# Patient Record
Sex: MALE | Race: WHITE | NOT HISPANIC OR LATINO | Employment: UNEMPLOYED | URBAN - NONMETROPOLITAN AREA
[De-identification: names, ages, dates, MRNs, and addresses within clinical notes are randomized per-mention and may not be internally consistent; named-entity substitution may affect disease eponyms.]

---

## 2017-05-18 ENCOUNTER — ALLSCRIPTS OFFICE VISIT (OUTPATIENT)
Dept: FAMILY MEDICINE CLINIC | Facility: CLINIC | Age: 22
End: 2017-05-18
Payer: COMMERCIAL

## 2017-05-18 DIAGNOSIS — R94.31 ABNORMAL ELECTROCARDIOGRAM: ICD-10-CM

## 2017-05-18 DIAGNOSIS — R06.02 SHORTNESS OF BREATH: ICD-10-CM

## 2017-05-18 PROCEDURE — 99213 OFFICE O/P EST LOW 20 MIN: CPT | Performed by: NURSE PRACTITIONER

## 2017-05-26 ENCOUNTER — TRANSCRIBE ORDERS (OUTPATIENT)
Dept: ADMINISTRATIVE | Facility: HOSPITAL | Age: 22
End: 2017-05-26

## 2017-05-26 DIAGNOSIS — R06.02 SHORTNESS OF BREATH: Primary | ICD-10-CM

## 2017-06-01 ENCOUNTER — GENERIC CONVERSION - ENCOUNTER (OUTPATIENT)
Dept: OTHER | Facility: OTHER | Age: 22
End: 2017-06-01

## 2017-06-01 ENCOUNTER — HOSPITAL ENCOUNTER (OUTPATIENT)
Dept: NON INVASIVE DIAGNOSTICS | Facility: HOSPITAL | Age: 22
Discharge: HOME | End: 2017-06-01
Payer: COMMERCIAL

## 2017-06-01 ENCOUNTER — HOSPITAL ENCOUNTER (OUTPATIENT)
Dept: PULMONOLOGY | Facility: HOSPITAL | Age: 22
Discharge: HOME | End: 2017-06-01
Payer: COMMERCIAL

## 2017-06-01 ENCOUNTER — APPOINTMENT (OUTPATIENT)
Dept: LAB | Facility: HOSPITAL | Age: 22
End: 2017-06-01
Payer: COMMERCIAL

## 2017-06-01 ENCOUNTER — HOSPITAL ENCOUNTER (OUTPATIENT)
Dept: RADIOLOGY | Facility: HOSPITAL | Age: 22
Discharge: HOME | End: 2017-06-01
Payer: COMMERCIAL

## 2017-06-01 DIAGNOSIS — R06.02 SHORTNESS OF BREATH: ICD-10-CM

## 2017-06-01 LAB
ALBUMIN SERPL BCP-MCNC: 4 G/DL (ref 3.5–5)
ALP SERPL-CCNC: 82 U/L (ref 46–116)
ALT SERPL W P-5'-P-CCNC: 90 U/L (ref 12–78)
ANION GAP SERPL CALCULATED.3IONS-SCNC: 10 MMOL/L (ref 4–13)
AST SERPL W P-5'-P-CCNC: 32 U/L (ref 5–45)
ATRIAL RATE: 75 BPM
BILIRUB SERPL-MCNC: 0.4 MG/DL (ref 0.2–1)
BUN SERPL-MCNC: 12 MG/DL (ref 5–25)
CALCIUM SERPL-MCNC: 9.7 MG/DL (ref 8.3–10.1)
CHLORIDE SERPL-SCNC: 103 MMOL/L (ref 100–108)
CHOLEST SERPL-MCNC: 198 MG/DL (ref 50–200)
CO2 SERPL-SCNC: 26 MMOL/L (ref 21–32)
CREAT SERPL-MCNC: 0.91 MG/DL (ref 0.6–1.3)
ERYTHROCYTE [DISTWIDTH] IN BLOOD BY AUTOMATED COUNT: 12.5 % (ref 11.6–15.1)
GFR SERPL CREATININE-BSD FRML MDRD: >60 ML/MIN/1.73SQ M
GLUCOSE P FAST SERPL-MCNC: 99 MG/DL (ref 65–99)
HCT VFR BLD AUTO: 45.2 % (ref 36.5–49.3)
HDLC SERPL-MCNC: 44 MG/DL (ref 40–60)
HGB BLD-MCNC: 15.9 G/DL (ref 12–17)
LDLC SERPL CALC-MCNC: 119 MG/DL (ref 0–100)
MCH RBC QN AUTO: 30.6 PG (ref 26.8–34.3)
MCHC RBC AUTO-ENTMCNC: 35.2 G/DL (ref 31.4–37.4)
MCV RBC AUTO: 87 FL (ref 82–98)
P AXIS: 13 DEGREES
PLATELET # BLD AUTO: 289 THOUSANDS/UL (ref 149–390)
PMV BLD AUTO: 9.5 FL (ref 8.9–12.7)
POTASSIUM SERPL-SCNC: 3.8 MMOL/L (ref 3.5–5.3)
PR INTERVAL: 158 MS
PROT SERPL-MCNC: 7 G/DL (ref 6.4–8.2)
QRS AXIS: 32 DEGREES
QRSD INTERVAL: 96 MS
QT INTERVAL: 370 MS
QTC INTERVAL: 413 MS
RBC # BLD AUTO: 5.2 MILLION/UL (ref 3.88–5.62)
SODIUM SERPL-SCNC: 139 MMOL/L (ref 136–145)
T WAVE AXIS: 11 DEGREES
TRIGL SERPL-MCNC: 174 MG/DL
TSH SERPL DL<=0.05 MIU/L-ACNC: 1.58 UIU/ML (ref 0.36–3.74)
VENTRICULAR RATE: 75 BPM
WBC # BLD AUTO: 7.71 THOUSAND/UL (ref 4.31–10.16)

## 2017-06-01 PROCEDURE — 94760 N-INVAS EAR/PLS OXIMETRY 1: CPT

## 2017-06-01 PROCEDURE — 84443 ASSAY THYROID STIM HORMONE: CPT

## 2017-06-01 PROCEDURE — 85027 COMPLETE CBC AUTOMATED: CPT

## 2017-06-01 PROCEDURE — 36415 COLL VENOUS BLD VENIPUNCTURE: CPT

## 2017-06-01 PROCEDURE — 94060 EVALUATION OF WHEEZING: CPT

## 2017-06-01 PROCEDURE — 80053 COMPREHEN METABOLIC PANEL: CPT

## 2017-06-01 PROCEDURE — 94727 GAS DIL/WSHOT DETER LNG VOL: CPT

## 2017-06-01 PROCEDURE — 94729 DIFFUSING CAPACITY: CPT

## 2017-06-01 PROCEDURE — 80061 LIPID PANEL: CPT

## 2017-06-01 PROCEDURE — 93005 ELECTROCARDIOGRAM TRACING: CPT

## 2017-06-01 PROCEDURE — 71020 HB CHEST X-RAY 2VW FRONTAL&LATL: CPT

## 2017-06-01 RX ORDER — ALBUTEROL SULFATE 2.5 MG/3ML
2.5 SOLUTION RESPIRATORY (INHALATION) ONCE AS NEEDED
Status: COMPLETED | OUTPATIENT
Start: 2017-06-01 | End: 2017-06-01

## 2017-06-01 RX ADMIN — ALBUTEROL SULFATE 2.5 MG: 2.5 SOLUTION RESPIRATORY (INHALATION) at 10:25

## 2017-06-05 ENCOUNTER — TRANSCRIBE ORDERS (OUTPATIENT)
Dept: ADMINISTRATIVE | Facility: HOSPITAL | Age: 22
End: 2017-06-05

## 2017-06-05 DIAGNOSIS — R94.31 NONSPECIFIC ABNORMAL ELECTROCARDIOGRAM (ECG) (EKG): Primary | ICD-10-CM

## 2017-06-08 ENCOUNTER — ALLSCRIPTS OFFICE VISIT (OUTPATIENT)
Dept: FAMILY MEDICINE CLINIC | Facility: CLINIC | Age: 22
End: 2017-06-08

## 2017-06-14 ENCOUNTER — HOSPITAL ENCOUNTER (OUTPATIENT)
Dept: NUCLEAR MEDICINE | Facility: HOSPITAL | Age: 22
Discharge: HOME | End: 2017-06-14
Payer: COMMERCIAL

## 2017-06-14 DIAGNOSIS — R94.31 NONSPECIFIC ABNORMAL ELECTROCARDIOGRAM (ECG) (EKG): ICD-10-CM

## 2017-06-14 PROCEDURE — 93017 CV STRESS TEST TRACING ONLY: CPT

## 2017-06-14 PROCEDURE — A9502 TC99M TETROFOSMIN: HCPCS

## 2017-06-14 PROCEDURE — 78452 HT MUSCLE IMAGE SPECT MULT: CPT

## 2017-07-28 ENCOUNTER — ALLSCRIPTS OFFICE VISIT (OUTPATIENT)
Dept: OTHER | Facility: OTHER | Age: 22
End: 2017-07-28

## 2018-01-09 NOTE — MISCELLANEOUS
Provider Comments  Provider Comments:   pt NO SHOWED his apt on 06/08/2017   NMG      Signatures   Electronically signed by : Aspen Welch, ; Jun 8 2017 11:39AM EST                       (Author)

## 2018-01-13 VITALS
SYSTOLIC BLOOD PRESSURE: 133 MMHG | HEART RATE: 82 BPM | DIASTOLIC BLOOD PRESSURE: 74 MMHG | BODY MASS INDEX: 33.43 KG/M2 | WEIGHT: 208 LBS | HEIGHT: 66 IN

## 2018-01-14 VITALS
HEART RATE: 86 BPM | BODY MASS INDEX: 33.43 KG/M2 | HEIGHT: 66 IN | SYSTOLIC BLOOD PRESSURE: 138 MMHG | WEIGHT: 208 LBS | OXYGEN SATURATION: 98 % | TEMPERATURE: 97.7 F | RESPIRATION RATE: 16 BRPM | DIASTOLIC BLOOD PRESSURE: 90 MMHG

## 2020-10-23 ENCOUNTER — HOSPITAL ENCOUNTER (EMERGENCY)
Facility: HOSPITAL | Age: 25
Discharge: HOME | End: 2020-10-23
Attending: FAMILY MEDICINE | Admitting: FAMILY MEDICINE
Payer: COMMERCIAL

## 2020-10-23 VITALS
HEART RATE: 98 BPM | OXYGEN SATURATION: 98 % | WEIGHT: 220 LBS | HEIGHT: 66 IN | DIASTOLIC BLOOD PRESSURE: 102 MMHG | BODY MASS INDEX: 35.36 KG/M2 | TEMPERATURE: 97 F | RESPIRATION RATE: 16 BRPM | SYSTOLIC BLOOD PRESSURE: 153 MMHG

## 2020-10-23 DIAGNOSIS — K02.9 DENTAL CARIES: ICD-10-CM

## 2020-10-23 DIAGNOSIS — K08.89 PAIN, DENTAL: Primary | ICD-10-CM

## 2020-10-23 DIAGNOSIS — K08.89 DENTALGIA: ICD-10-CM

## 2020-10-23 PROCEDURE — 99282 EMERGENCY DEPT VISIT SF MDM: CPT

## 2020-10-23 PROCEDURE — 99284 EMERGENCY DEPT VISIT MOD MDM: CPT | Performed by: FAMILY MEDICINE

## 2020-10-23 RX ORDER — IBUPROFEN 600 MG/1
600 TABLET ORAL ONCE
Status: COMPLETED | OUTPATIENT
Start: 2020-10-23 | End: 2020-10-23

## 2020-10-23 RX ORDER — AMOXICILLIN 500 MG/1
500 CAPSULE ORAL ONCE
Status: COMPLETED | OUTPATIENT
Start: 2020-10-23 | End: 2020-10-23

## 2020-10-23 RX ORDER — IBUPROFEN 600 MG/1
600 TABLET ORAL EVERY 6 HOURS PRN
Qty: 30 TABLET | Refills: 0 | Status: SHIPPED | OUTPATIENT
Start: 2020-10-23 | End: 2020-10-28

## 2020-10-23 RX ORDER — AMOXICILLIN 500 MG/1
500 CAPSULE ORAL EVERY 12 HOURS SCHEDULED
Qty: 14 CAPSULE | Refills: 0 | Status: SHIPPED | OUTPATIENT
Start: 2020-10-23 | End: 2020-10-30

## 2020-10-23 RX ORDER — PREDNISONE 20 MG/1
40 TABLET ORAL ONCE
Status: COMPLETED | OUTPATIENT
Start: 2020-10-23 | End: 2020-10-23

## 2020-10-23 RX ADMIN — AMOXICILLIN 500 MG: 500 CAPSULE ORAL at 15:33

## 2020-10-23 RX ADMIN — IBUPROFEN 600 MG: 600 TABLET, FILM COATED ORAL at 15:33

## 2020-10-23 RX ADMIN — PREDNISONE 40 MG: 20 TABLET ORAL at 15:32

## 2021-04-02 ENCOUNTER — TELEPHONE (OUTPATIENT)
Dept: PSYCHIATRY | Facility: CLINIC | Age: 26
End: 2021-04-02

## 2023-05-17 ENCOUNTER — HOSPITAL ENCOUNTER (EMERGENCY)
Facility: HOSPITAL | Age: 28
Discharge: HOME | End: 2023-05-17
Attending: EMERGENCY MEDICINE
Payer: COMMERCIAL

## 2023-05-17 ENCOUNTER — APPOINTMENT (EMERGENCY)
Dept: RADIOLOGY | Facility: HOSPITAL | Age: 28
End: 2023-05-17
Payer: COMMERCIAL

## 2023-05-17 ENCOUNTER — OFFICE VISIT (OUTPATIENT)
Dept: URGENT CARE | Facility: MEDICAL CENTER | Age: 28
End: 2023-05-17

## 2023-05-17 VITALS
WEIGHT: 227 LBS | RESPIRATION RATE: 20 BRPM | BODY MASS INDEX: 36.48 KG/M2 | DIASTOLIC BLOOD PRESSURE: 70 MMHG | OXYGEN SATURATION: 94 % | HEIGHT: 66 IN | TEMPERATURE: 97.9 F | HEART RATE: 103 BPM | SYSTOLIC BLOOD PRESSURE: 135 MMHG

## 2023-05-17 VITALS
TEMPERATURE: 98.4 F | HEART RATE: 106 BPM | SYSTOLIC BLOOD PRESSURE: 124 MMHG | HEIGHT: 66 IN | DIASTOLIC BLOOD PRESSURE: 90 MMHG | WEIGHT: 227.4 LBS | RESPIRATION RATE: 24 BRPM | BODY MASS INDEX: 36.55 KG/M2 | OXYGEN SATURATION: 96 %

## 2023-05-17 DIAGNOSIS — R07.9 CHEST PAIN: Primary | ICD-10-CM

## 2023-05-17 DIAGNOSIS — R06.00 DYSPNEA, UNSPECIFIED TYPE: ICD-10-CM

## 2023-05-17 DIAGNOSIS — R07.9 CHEST PAIN, UNSPECIFIED TYPE: Primary | ICD-10-CM

## 2023-05-17 PROBLEM — R94.31 ABNORMAL ELECTROCARDIOGRAPHY: Status: ACTIVE | Noted: 2017-06-05

## 2023-05-17 PROBLEM — R19.02 ABDOMINAL MASS, LUQ (LEFT UPPER QUADRANT): Status: ACTIVE | Noted: 2017-07-28

## 2023-05-17 PROBLEM — R06.02 SHORTNESS OF BREATH: Status: ACTIVE | Noted: 2017-05-18

## 2023-05-17 LAB
2HR DELTA HS TROPONIN: -1 NG/L
ALBUMIN SERPL BCP-MCNC: 4.3 G/DL (ref 3.5–5)
ALP SERPL-CCNC: 53 U/L (ref 34–104)
ALT SERPL W P-5'-P-CCNC: 58 U/L (ref 7–52)
ANION GAP SERPL CALCULATED.3IONS-SCNC: 9 MMOL/L (ref 4–13)
AST SERPL W P-5'-P-CCNC: 29 U/L (ref 13–39)
BASOPHILS # BLD AUTO: 0.05 THOUSANDS/ÂΜL (ref 0–0.1)
BASOPHILS NFR BLD AUTO: 0 % (ref 0–1)
BILIRUB SERPL-MCNC: 0.5 MG/DL (ref 0.2–1)
BUN SERPL-MCNC: 12 MG/DL (ref 5–25)
CALCIUM SERPL-MCNC: 9.5 MG/DL (ref 8.4–10.2)
CARDIAC TROPONIN I PNL SERPL HS: 2 NG/L
CARDIAC TROPONIN I PNL SERPL HS: 3 NG/L
CHLORIDE SERPL-SCNC: 106 MMOL/L (ref 96–108)
CO2 SERPL-SCNC: 26 MMOL/L (ref 21–32)
CREAT SERPL-MCNC: 0.93 MG/DL (ref 0.6–1.3)
D DIMER PPP FEU-MCNC: <0.27 UG/ML FEU
EOSINOPHIL # BLD AUTO: 0.45 THOUSAND/ÂΜL (ref 0–0.61)
EOSINOPHIL NFR BLD AUTO: 4 % (ref 0–6)
ERYTHROCYTE [DISTWIDTH] IN BLOOD BY AUTOMATED COUNT: 12.7 % (ref 11.6–15.1)
GFR SERPL CREATININE-BSD FRML MDRD: 111 ML/MIN/1.73SQ M
GLUCOSE SERPL-MCNC: 105 MG/DL (ref 65–140)
HCT VFR BLD AUTO: 45.8 % (ref 36.5–49.3)
HGB BLD-MCNC: 15.8 G/DL (ref 12–17)
HOLD SPECIMEN: NORMAL
IMM GRANULOCYTES # BLD AUTO: 0.04 THOUSAND/UL (ref 0–0.2)
IMM GRANULOCYTES NFR BLD AUTO: 0 % (ref 0–2)
LYMPHOCYTES # BLD AUTO: 1.85 THOUSANDS/ÂΜL (ref 0.6–4.47)
LYMPHOCYTES NFR BLD AUTO: 14 % (ref 14–44)
MCH RBC QN AUTO: 30.5 PG (ref 26.8–34.3)
MCHC RBC AUTO-ENTMCNC: 34.5 G/DL (ref 31.4–37.4)
MCV RBC AUTO: 88 FL (ref 82–98)
MONOCYTES # BLD AUTO: 0.88 THOUSAND/ÂΜL (ref 0.17–1.22)
MONOCYTES NFR BLD AUTO: 7 % (ref 4–12)
NEUTROPHILS # BLD AUTO: 9.71 THOUSANDS/ÂΜL (ref 1.85–7.62)
NEUTS SEG NFR BLD AUTO: 75 % (ref 43–75)
NRBC BLD AUTO-RTO: 0 /100 WBCS
PLATELET # BLD AUTO: 336 THOUSANDS/UL (ref 149–390)
PMV BLD AUTO: 9.2 FL (ref 8.9–12.7)
POTASSIUM SERPL-SCNC: 3.7 MMOL/L (ref 3.5–5.3)
PROT SERPL-MCNC: 6.6 G/DL (ref 6.4–8.4)
RBC # BLD AUTO: 5.18 MILLION/UL (ref 3.88–5.62)
SODIUM SERPL-SCNC: 141 MMOL/L (ref 135–147)
WBC # BLD AUTO: 12.98 THOUSAND/UL (ref 4.31–10.16)

## 2023-05-17 PROCEDURE — 80053 COMPREHEN METABOLIC PANEL: CPT | Performed by: EMERGENCY MEDICINE

## 2023-05-17 PROCEDURE — 93005 ELECTROCARDIOGRAM TRACING: CPT

## 2023-05-17 PROCEDURE — 96374 THER/PROPH/DIAG INJ IV PUSH: CPT

## 2023-05-17 PROCEDURE — 85025 COMPLETE CBC W/AUTO DIFF WBC: CPT | Performed by: EMERGENCY MEDICINE

## 2023-05-17 PROCEDURE — 36415 COLL VENOUS BLD VENIPUNCTURE: CPT

## 2023-05-17 PROCEDURE — 84484 ASSAY OF TROPONIN QUANT: CPT | Performed by: EMERGENCY MEDICINE

## 2023-05-17 PROCEDURE — 71045 X-RAY EXAM CHEST 1 VIEW: CPT

## 2023-05-17 PROCEDURE — 99285 EMERGENCY DEPT VISIT HI MDM: CPT

## 2023-05-17 PROCEDURE — 96375 TX/PRO/DX INJ NEW DRUG ADDON: CPT

## 2023-05-17 PROCEDURE — 99285 EMERGENCY DEPT VISIT HI MDM: CPT | Performed by: EMERGENCY MEDICINE

## 2023-05-17 PROCEDURE — 85379 FIBRIN DEGRADATION QUANT: CPT | Performed by: EMERGENCY MEDICINE

## 2023-05-17 RX ORDER — KETOROLAC TROMETHAMINE 30 MG/ML
15 INJECTION, SOLUTION INTRAMUSCULAR; INTRAVENOUS ONCE
Status: COMPLETED | OUTPATIENT
Start: 2023-05-17 | End: 2023-05-17

## 2023-05-17 RX ORDER — LORATADINE 10 MG/1
10 TABLET ORAL DAILY
COMMUNITY

## 2023-05-17 RX ORDER — METHOCARBAMOL 500 MG/1
500 TABLET, FILM COATED ORAL 2 TIMES DAILY
Qty: 20 TABLET | Refills: 0 | Status: SHIPPED | OUTPATIENT
Start: 2023-05-17

## 2023-05-17 RX ORDER — LORAZEPAM 2 MG/ML
1 INJECTION INTRAMUSCULAR ONCE
Status: COMPLETED | OUTPATIENT
Start: 2023-05-17 | End: 2023-05-17

## 2023-05-17 RX ORDER — IBUPROFEN 600 MG/1
600 TABLET ORAL EVERY 6 HOURS PRN
Qty: 30 TABLET | Refills: 0 | Status: SHIPPED | OUTPATIENT
Start: 2023-05-17

## 2023-05-17 RX ADMIN — KETOROLAC TROMETHAMINE 15 MG: 30 INJECTION, SOLUTION INTRAMUSCULAR; INTRAVENOUS at 18:32

## 2023-05-17 RX ADMIN — LORAZEPAM 1 MG: 2 INJECTION, SOLUTION INTRAMUSCULAR; INTRAVENOUS at 18:31

## 2023-05-17 NOTE — Clinical Note
José Miguel Carolina was seen and treated in our emergency department on 5/17/2023  Diagnosis:     Anamika Akers  may return to work on return date  He may return on this date: 05/19/2023         If you have any questions or concerns, please don't hesitate to call        Raina Walker DO    ______________________________           _______________          _______________  Hospital Representative                              Date                                Time normal S1, S2 heard

## 2023-05-17 NOTE — DISCHARGE INSTRUCTIONS
Thank you for visiting the Emergency Department today  Blood test for blood clots negative  Heart enzymes negative on initial and 2 hr repeat  EKG normal  No signs of heart attack  Chest X-ray normal no signs of collapsed lung or infection    Possible causes:  1) Viral inflammation of the cartilage of the chest wall (costochondritis) this is self-limited and treatment is NASIDs and time  2) Anxiety/stress reaction - the result of the nervous system amplifying nerve sensitivity and releasing stress hormones which can create a feedback loop causing worsening symptoms  Rx for muscle relaxant and high-dose motrin sent to pharmacy    Recommend ibuprofen for pain  Rest  Plenty of fluids  Return for severe symptoms  Work note provided for today and tomorrow

## 2023-05-17 NOTE — Clinical Note
Son Quinones was seen and treated in our emergency department on 5/17/2023  Diagnosis:     Olivier Coy  may return to work on return date  He may return on this date: 05/19/2023         If you have any questions or concerns, please don't hesitate to call        Shaniqua Cutler DO    ______________________________           _______________          _______________  Hospital Representative                              Date                                Time

## 2023-05-17 NOTE — PROGRESS NOTES
[facility]        NAME: Miguel Jane is a 29 y o  male  : 1995    MRN: [id number]  DATE: May 17, 2023  TIME: 4:38 PM    Assessment and Plan   Chest pain, unspecified type [R07 9]  1  Chest pain, unspecified type  Transfer to other facility      2  Dyspnea, unspecified type  Transfer to other facility            Patient Instructions       Go directly to the emergency department for full evaluation for cardiac concerns    Chief Complaint     Chief Complaint   Patient presents with   • Shortness of Breath     Sob Started yesterday suddenly then went away  Started again today at 1pm  Does have some chest discomfort  History of Present Illness       Patient states he started with pain on his chest without radiation and feels like something sitting on his chest  He reports pain with breathing and trouble breathing  He states this came on suddenly, and he states it also happened yesterday and last for most of the day  Onset was around 1pm today  He has a cardiac problem concerns back in 2017 which he just recalled when discussing old EKGs  Ambulatory into the office appears dyspneic pale and diaphoretic  Reporting chest discomfort midsternal without radiation  Nonreproducible pain  Patient reports he had the same symptoms yesterday and was not seen at that time  After patient assessment and cardiogram reviewed when discussing his EKG he further elaborated on a similar condition which was years ago  He denies any significant family history that he is aware of of cardiac concern  There is high blood pressure in the family  I discussed the importance of being evaluated in the emergency department for further cardiac evaluation preferably being transported to the hospital via ambulance    Patient is not willing to go by ambulance given that he is alert oriented and able to make his own decisions he reports he is willing to be evaluated in the emergency department as long as he can take himself there as he is concerned about being transferred by EMS  We discussed the concerns with driving while he has the potential of a sudden cardiac event and concerns for injuring himself as well as others  Patient verbalized understanding and continues to kindly declined EMS transfer  Review of Systems   Review of Systems   Constitutional: Negative for chills and fever  HENT: Negative for congestion, ear pain, postnasal drip, rhinorrhea, sinus pressure, sinus pain and sore throat  Eyes: Negative for pain and visual disturbance  Respiratory: Positive for chest tightness and shortness of breath  Negative for cough  Cardiovascular: Positive for chest pain and palpitations  Gastrointestinal: Negative for abdominal pain and vomiting  Genitourinary: Negative for dysuria and hematuria  Musculoskeletal: Negative for arthralgias and back pain  Skin: Positive for pallor  Negative for color change and rash  Neurological: Negative for dizziness, seizures, syncope, facial asymmetry, light-headedness and headaches  All other systems reviewed and are negative  Current Medications       Current Outpatient Medications:   •  loratadine (CLARITIN) 10 mg tablet, Take 10 mg by mouth daily, Disp: , Rfl:   •  ibuprofen (MOTRIN) 600 mg tablet, Take 1 tablet (600 mg total) by mouth every 6 (six) hours as needed for mild pain for up to 5 days, Disp: 30 tablet, Rfl: 0    Current Allergies     Allergies as of 05/17/2023   • (No Known Allergies)            The following portions of the patient's history were reviewed and updated as appropriate: allergies, current medications, past family history, past medical history, past social history, past surgical history and problem list      History reviewed  No pertinent past medical history  Past Surgical History:   Procedure Laterality Date   • TENDON REPAIR         History reviewed  No pertinent family history        Medications have been "verified  Objective   /90   Pulse (!) 106   Temp 98 4 °F (36 9 °C)   Resp (!) 24   Ht 5' 6\" (1 676 m)   Wt 103 kg (227 lb 6 4 oz)   SpO2 96%   BMI 36 70 kg/m²        Physical Exam     Physical Exam  Vitals and nursing note reviewed  Constitutional:       General: He is awake  He is in acute distress  Appearance: Normal appearance  He is well-developed  He is obese  He is ill-appearing  HENT:      Head: Normocephalic  Jaw: There is normal jaw occlusion  Nose: Nose normal       Mouth/Throat:      Lips: Pink  Mouth: Mucous membranes are moist    Eyes:      Extraocular Movements: Extraocular movements intact  Conjunctiva/sclera: Conjunctivae normal       Pupils: Pupils are equal, round, and reactive to light  Cardiovascular:      Rate and Rhythm: Regular rhythm  Tachycardia present  Pulses: Normal pulses  Heart sounds: Normal heart sounds, S1 normal and S2 normal  No murmur heard  Comments: EKG Interpretation:     Rate: 104   Rhythm: sinus tachycardia   QRS Axis: 145 (right axis deviation)   TX Interval: 160   QRS Duration: 84   QTc: 423   Voltages: variable throughout   Conduction Disturbances: none   Other Abnormalities: Possible delta waves noted  Non-specific ST/Inferior wall changes   Narrative Interpretation: Sinus tachycardiac with abnormalities noted when compared to previous EKG    Pulmonary:      Effort: Tachypnea present  Breath sounds: Normal breath sounds  Chest:      Chest wall: No tenderness  Abdominal:      Palpations: Abdomen is soft  Musculoskeletal:         General: Normal range of motion  Cervical back: Full passive range of motion without pain, normal range of motion and neck supple  Skin:     General: Skin is warm and moist       Capillary Refill: Capillary refill takes less than 2 seconds  Coloration: Skin is pale  Comments: Diaphoretic   Neurological:      General: No focal deficit present        Mental " Status: He is alert  Psychiatric:         Behavior: Behavior is cooperative

## 2023-05-18 NOTE — ED PROVIDER NOTES
"History  Chief Complaint   Patient presents with   • Shortness of Breath     Pt arrives from urgent care with c/o sudden onset SOB starting at 1300 today  Pt also c/o midsternal CP 5/10 described as \"pressure\"  EKG in progress       History provided by:  Patient and medical records  Shortness of Breath  Severity:  Moderate  Onset quality:  Sudden  Duration:  4 hours  Timing:  Constant  Progression:  Unchanged  Chronicity:  Recurrent  Relieved by:  Nothing  Worsened by:  Deep breathing and stress  Ineffective treatments:  None tried  Associated symptoms: chest pain    Associated symptoms: no abdominal pain, no cough, no diaphoresis, no ear pain, no fever, no headaches, no hemoptysis, no neck pain, no rash, no sore throat, no sputum production, no syncope, no vomiting and no wheezing    Risk factors: obesity    Risk factors: no hx of cancer, no hx of PE/DVT, no prolonged immobilization, no recent surgery and no tobacco use        Prior to Admission Medications   Prescriptions Last Dose Informant Patient Reported? Taking?   ibuprofen (MOTRIN) 600 mg tablet   No No   Sig: Take 1 tablet (600 mg total) by mouth every 6 (six) hours as needed for mild pain for up to 5 days   loratadine (CLARITIN) 10 mg tablet   Yes No   Sig: Take 10 mg by mouth daily      Facility-Administered Medications: None       History reviewed  No pertinent past medical history  Past Surgical History:   Procedure Laterality Date   • TENDON REPAIR         History reviewed  No pertinent family history  I have reviewed and agree with the history as documented  E-Cigarette/Vaping   • E-Cigarette Use Never User      E-Cigarette/Vaping Substances     Social History     Tobacco Use   • Smoking status: Never   • Smokeless tobacco: Never   Vaping Use   • Vaping Use: Never used   Substance Use Topics   • Alcohol use: Never   • Drug use: Never       Review of Systems   Constitutional: Negative for chills, diaphoresis and fever     HENT: Negative for ear " pain and sore throat  Eyes: Negative for pain and visual disturbance  Respiratory: Positive for shortness of breath  Negative for cough, hemoptysis, sputum production and wheezing  Cardiovascular: Positive for chest pain  Negative for palpitations, leg swelling and syncope  Gastrointestinal: Negative for abdominal pain, diarrhea, nausea and vomiting  Genitourinary: Negative for dysuria and hematuria  Musculoskeletal: Negative for arthralgias, back pain and neck pain  Skin: Negative for color change and rash  Neurological: Negative for dizziness, seizures, syncope, numbness and headaches  All other systems reviewed and are negative  Physical Exam  Physical Exam  Vitals and nursing note reviewed  Constitutional:       General: He is in acute distress  Appearance: He is well-developed  He is obese  He is not ill-appearing, toxic-appearing or diaphoretic  HENT:      Head: Normocephalic and atraumatic  Eyes:      Extraocular Movements: Extraocular movements intact  Pupils: Pupils are equal, round, and reactive to light  Neck:      Vascular: No JVD  Trachea: No tracheal deviation  Cardiovascular:      Rate and Rhythm: Regular rhythm  Tachycardia present  No extrasystoles are present  Heart sounds: No murmur heard  No friction rub  Pulmonary:      Effort: Tachypnea present  No respiratory distress  Breath sounds: Normal breath sounds  No stridor  No decreased breath sounds, wheezing, rhonchi or rales  Chest:      Chest wall: Tenderness present  Abdominal:      Palpations: Abdomen is soft  Tenderness: There is no abdominal tenderness  There is no guarding or rebound  Musculoskeletal:         General: Normal range of motion  Cervical back: Normal range of motion  Right lower leg: No edema  Left lower leg: No edema  Skin:     General: Skin is warm and dry  Capillary Refill: Capillary refill takes less than 2 seconds  Coloration: Skin is not cyanotic or pale  Neurological:      General: No focal deficit present  Mental Status: He is alert and oriented to person, place, and time  Psychiatric:         Mood and Affect: Mood is anxious  Vital Signs  ED Triage Vitals   Temperature Pulse Respirations Blood Pressure SpO2   05/17/23 1641 05/17/23 1641 05/17/23 1641 05/17/23 1642 05/17/23 1641   97 9 °F (36 6 °C) (!) 108 (!) 24 149/69 95 %      Temp Source Heart Rate Source Patient Position - Orthostatic VS BP Location FiO2 (%)   05/17/23 1641 05/17/23 1641 05/17/23 1641 05/17/23 1641 --   Temporal Monitor Sitting Right arm       Pain Score       05/17/23 1641       8           Vitals:    05/17/23 1642 05/17/23 1746 05/17/23 1915 05/17/23 1930   BP: 149/69 150/84 152/73 135/70   Pulse:  102 103 103   Patient Position - Orthostatic VS:   Sitting Sitting         Visual Acuity      ED Medications  Medications   LORazepam (ATIVAN) injection 1 mg (1 mg Intravenous Given 5/17/23 1831)   ketorolac (TORADOL) injection 15 mg (15 mg Intravenous Given 5/17/23 1832)       Diagnostic Studies  Results Reviewed     Procedure Component Value Units Date/Time    HS Troponin I 2hr [[id number]]  (Normal) Collected: 05/17/23 1916    Lab Status: Final result Specimen: Blood from Arm, Right Updated: 05/17/23 1946     hs TnI 2hr 2 ng/L      Delta 2hr hsTnI -1 ng/L     [place] draw [[id number]] Collected: 05/17/23 1705    Lab Status: Final result Specimen: Blood from Arm, Right Updated: 05/17/23 1830    Narrative: The following orders were created for panel order [place] draw  Procedure                               Abnormality         Status                     ---------                               -----------         ------                     Meghan Olivera / Yellow tube on GZWU[[id number]]                       Final result                 Please view results for these tests on the individual orders      D-dimer, quantitative [[id number]]  (Normal) Collected: 05/17/23 1705    Lab Status: Final result Specimen: Blood from Arm, Right Updated: 05/17/23 1749     D-Dimer, Quant <0 27 ug/ml FEU     HS Troponin 0hr (reflex protocol) [[id number]]  (Normal) Collected: 05/17/23 1705    Lab Status: Final result Specimen: Blood from Arm, Right Updated: 05/17/23 1734     hs TnI 0hr 3 ng/L     Comprehensive metabolic panel [[id number]]  (Abnormal) Collected: 05/17/23 1705    Lab Status: Final result Specimen: Blood from Arm, Right Updated: 05/17/23 1728     Sodium 141 mmol/L      Potassium 3 7 mmol/L      Chloride 106 mmol/L      CO2 26 mmol/L      ANION GAP 9 mmol/L      BUN 12 mg/dL      Creatinine 0 93 mg/dL      Glucose 105 mg/dL      Calcium 9 5 mg/dL      AST 29 U/L      ALT 58 U/L      Alkaline Phosphatase 53 U/L      Total Protein 6 6 g/dL      Albumin 4 3 g/dL      Total Bilirubin 0 50 mg/dL      eGFR 111 ml/min/1 73sq m     Narrative:      National Kidney Disease Foundation guidelines for Chronic Kidney Disease (CKD):   •  Stage 1 with normal or high GFR (GFR > 90 mL/min/1 73 square meters)  •  Stage 2 Mild CKD (GFR = 60-89 mL/min/1 73 square meters)  •  Stage 3A Moderate CKD (GFR = 45-59 mL/min/1 73 square meters)  •  Stage 3B Moderate CKD (GFR = 30-44 mL/min/1 73 square meters)  •  Stage 4 Severe CKD (GFR = 15-29 mL/min/1 73 square meters)  •  Stage 5 End Stage CKD (GFR <15 mL/min/1 73 square meters)  Note: GFR calculation is accurate only with a steady state creatinine    CBC and differential [[id number]]  (Abnormal) Collected: 05/17/23 1705    Lab Status: Final result Specimen: Blood from Arm, Right Updated: 05/17/23 1712     WBC 12 98 Thousand/uL      RBC 5 18 Million/uL      Hemoglobin 15 8 g/dL      Hematocrit 45 8 %      MCV 88 fL      MCH 30 5 pg      MCHC 34 5 g/dL      RDW 12 7 %      MPV 9 2 fL      Platelets 263 Thousands/uL      nRBC 0 /100 WBCs      Neutrophils Relative 75 %      Immat GRANS % 0 %      Lymphocytes Relative 14 %      Monocytes Relative 7 % Eosinophils Relative 4 %      Basophils Relative 0 %      Neutrophils Absolute 9 71 Thousands/µL      Immature Grans Absolute 0 04 Thousand/uL      Lymphocytes Absolute 1 85 Thousands/µL      Monocytes Absolute 0 88 Thousand/µL      Eosinophils Absolute 0 45 Thousand/µL      Basophils Absolute 0 05 Thousands/µL                  XR chest 1 view portable   Final Result by Radha Merritt MD (05/18 0596)      No acute cardiopulmonary disease  Workstation performed: [id number]                    Procedures  Procedures         ED Course  ED Course as of 05/18/23 1005   Wed May 17, 2023   1741 EKG interpreted by myself  EKG dated 5/17/2023 at 1647 demonstrates sinus tachycardia 103 bpm, normal MO, QRS, QT intervals, no STEMI  1758 D-Dimer, Quant: <0 27             HEART Risk Score    Flowsheet Row Most Recent Value   Heart Score Risk Calculator    History 0 Filed at: 05/18/2023 1002   ECG 0 Filed at: 05/18/2023 1002   Age 0 Filed at: 05/18/2023 1002   Risk Factors 0 Filed at: 05/18/2023 1002   Troponin 0 Filed at: 05/18/2023 1002   HEART Score 0 Filed at: 05/18/2023 1002                PERC Rule for PE    Flowsheet Row Most Recent Value   PERC Rule for PE    Age >=50 0 Filed at: 05/18/2023 1002   HR >=100 1 Filed at: 05/18/2023 1002   O2 Sat on room air < 95% 0 Filed at: 05/18/2023 1002   History of PE or DVT 0 Filed at: 05/18/2023 1002   Recent trauma or surgery 0 Filed at: 05/18/2023 1002   Hemoptysis 0 Filed at: 05/18/2023 1002   Exogenous estrogen 0 Filed at: 05/18/2023 1002   Unilateral leg swelling 0 Filed at: 05/18/2023 1002   PERC Rule for PE Results 1 Filed at: 05/18/2023 1002              SBIRT 22yo+    Flowsheet Row Most Recent Value   Initial Alcohol Screen: US AUDIT-C     1  How often do you have a drink containing alcohol? 0 Filed at: 05/17/2023 1706   2  How many drinks containing alcohol do you have on a typical day you are drinking? 0 Filed at: 05/17/2023 1706   3a   Male UNDER 65: How often do you have five or more drinks on one occasion? 0 Filed at: 05/17/2023 1706   3b  FEMALE Any Age, or MALE 65+: How often do you have 4 or more drinks on one occassion? 0 Filed at: 05/17/2023 1706   Audit-C Score 0 Filed at: 05/17/2023 1706   VINAYAK: How many times in the past year have you    Used an illegal drug or used a prescription medication for non-medical reasons? Never Filed at: 05/17/2023 1706          Wells' Criteria for PE    Flowsheet Row Most Recent Value   Wells' Criteria for PE    Clinical signs and symptoms of DVT 0 Filed at: 05/18/2023 1002   PE is primary diagnosis or equally likely 0 Filed at: 05/18/2023 1002   HR >100 1 5 Filed at: 05/18/2023 1002   Immobilization at least 3 days or Surgery in the previous 4 weeks 0 Filed at: 05/18/2023 1002   Previous, objectively diagnosed PE or DVT 0 Filed at: 05/18/2023 1002   Hemoptysis 0 Filed at: 05/18/2023 1002   Malignancy with treatment within 6 months or palliative 0 Filed at: 05/18/2023 1002   Wells' Criteria Total 1 5 Filed at: 05/18/2023 1002                Medical Decision Making  28M several hour hx of chest pain in anterior chest, associated with SOB  Patient appears anxious, tachypneic,with mild anterior chest wall tenderness to palpation but otherwise nonfocal exam  PERC does not apply  D dimer is negative however  EKG no acute ischemic changes no arrhyhtmia on cardiac monitor or EKG  Suspect anxiety component  Patient treated with ativan, toradol with improvement in symptoms  Discussed RTED precautions  Possible MSK component  Rx for robaxin and motrin  Chest pain: acute illness or injury  Amount and/or Complexity of Data Reviewed  Labs: ordered  Decision-making details documented in ED Course  Radiology: ordered  ECG/medicine tests: ordered and independent interpretation performed  Decision-making details documented in ED Course  Risk  Prescription drug management            Disposition  Final diagnoses:   Chest pain Time reflects when diagnosis was documented in both MDM as applicable and the Disposition within this note     Time User Action Codes Description Comment    5/17/2023  7:48 PM Duane Cuff Add [R07 9] Chest pain       ED Disposition     ED Disposition   Discharge    Condition   Stable    Date/Time   Wed May 17, 2023  7:48 PM    Comment   [facility] discharge to home/self care  Follow-up Information    None         Discharge Medication List as of 5/17/2023  8:14 PM      START taking these medications    Details   methocarbamol (ROBAXIN) 500 mg tablet Take 1 tablet (500 mg total) by mouth 2 (two) times a day, Starting Wed 5/17/2023, Normal         CONTINUE these medications which have CHANGED    Details   ibuprofen (MOTRIN) 600 mg tablet Take 1 tablet (600 mg total) by mouth every 6 (six) hours as needed for moderate pain, Starting Wed 5/17/2023, Normal         CONTINUE these medications which have NOT CHANGED    Details   loratadine (CLARITIN) 10 mg tablet Take 10 mg by mouth daily, Historical Med             No discharge procedures on file      PDMP Review     None          ED Provider  Electronically Signed by           Alexander Pinto DO  05/18/23 7447

## 2023-05-19 LAB
ATRIAL RATE: 103 BPM
P AXIS: 22 DEGREES
PR INTERVAL: 142 MS
QRS AXIS: 38 DEGREES
QRSD INTERVAL: 86 MS
QT INTERVAL: 326 MS
QTC INTERVAL: 427 MS
T WAVE AXIS: 52 DEGREES
VENTRICULAR RATE: 103 BPM

## 2023-05-19 PROCEDURE — 93010 ELECTROCARDIOGRAM REPORT: CPT | Performed by: INTERNAL MEDICINE
